# Patient Record
Sex: MALE | Race: WHITE | NOT HISPANIC OR LATINO
[De-identification: names, ages, dates, MRNs, and addresses within clinical notes are randomized per-mention and may not be internally consistent; named-entity substitution may affect disease eponyms.]

---

## 2018-08-14 ENCOUNTER — APPOINTMENT (OUTPATIENT)
Dept: VASCULAR SURGERY | Facility: CLINIC | Age: 43
End: 2018-08-14
Payer: OTHER MISCELLANEOUS

## 2018-08-14 VITALS
WEIGHT: 245 LBS | BODY MASS INDEX: 34.3 KG/M2 | DIASTOLIC BLOOD PRESSURE: 86 MMHG | HEIGHT: 71 IN | HEART RATE: 82 BPM | TEMPERATURE: 98.5 F | SYSTOLIC BLOOD PRESSURE: 130 MMHG

## 2018-08-14 PROCEDURE — 99242 OFF/OP CONSLTJ NEW/EST SF 20: CPT

## 2018-08-14 PROCEDURE — 93971 EXTREMITY STUDY: CPT

## 2018-09-24 ENCOUNTER — APPOINTMENT (OUTPATIENT)
Dept: VASCULAR SURGERY | Facility: CLINIC | Age: 43
End: 2018-09-24
Payer: OTHER MISCELLANEOUS

## 2018-09-24 VITALS
SYSTOLIC BLOOD PRESSURE: 143 MMHG | BODY MASS INDEX: 36.4 KG/M2 | DIASTOLIC BLOOD PRESSURE: 81 MMHG | HEART RATE: 88 BPM | TEMPERATURE: 98.7 F | WEIGHT: 260 LBS | HEIGHT: 71 IN

## 2018-09-24 PROCEDURE — 99212 OFFICE O/P EST SF 10 MIN: CPT

## 2018-12-24 ENCOUNTER — APPOINTMENT (OUTPATIENT)
Dept: VASCULAR SURGERY | Facility: CLINIC | Age: 43
End: 2018-12-24
Payer: OTHER MISCELLANEOUS

## 2018-12-24 VITALS
WEIGHT: 260 LBS | DIASTOLIC BLOOD PRESSURE: 85 MMHG | HEIGHT: 71 IN | SYSTOLIC BLOOD PRESSURE: 124 MMHG | BODY MASS INDEX: 36.4 KG/M2 | TEMPERATURE: 98.4 F | HEART RATE: 83 BPM

## 2018-12-24 PROCEDURE — 99213 OFFICE O/P EST LOW 20 MIN: CPT

## 2019-04-03 ENCOUNTER — APPOINTMENT (OUTPATIENT)
Dept: VASCULAR SURGERY | Facility: CLINIC | Age: 44
End: 2019-04-03
Payer: OTHER MISCELLANEOUS

## 2019-04-03 VITALS
TEMPERATURE: 98.2 F | BODY MASS INDEX: 34.72 KG/M2 | DIASTOLIC BLOOD PRESSURE: 81 MMHG | SYSTOLIC BLOOD PRESSURE: 116 MMHG | HEIGHT: 71 IN | WEIGHT: 248 LBS | HEART RATE: 81 BPM

## 2019-04-03 DIAGNOSIS — S81.812S LACERATION W/OUT FOREIGN BODY, LEFT LOWER LEG, SEQUELA: ICD-10-CM

## 2019-04-03 PROCEDURE — 99213 OFFICE O/P EST LOW 20 MIN: CPT

## 2019-07-17 ENCOUNTER — APPOINTMENT (OUTPATIENT)
Dept: VASCULAR SURGERY | Facility: CLINIC | Age: 44
End: 2019-07-17

## 2022-10-31 ENCOUNTER — APPOINTMENT (OUTPATIENT)
Dept: ORTHOPEDIC SURGERY | Facility: CLINIC | Age: 47
End: 2022-10-31

## 2022-10-31 DIAGNOSIS — M25.672 STIFFNESS OF LEFT ANKLE, NOT ELSEWHERE CLASSIFIED: ICD-10-CM

## 2022-10-31 DIAGNOSIS — Z00.00 ENCOUNTER FOR GENERAL ADULT MEDICAL EXAMINATION W/OUT ABNORMAL FINDINGS: ICD-10-CM

## 2022-10-31 PROCEDURE — 73630 X-RAY EXAM OF FOOT: CPT | Mod: 50

## 2022-10-31 PROCEDURE — 99214 OFFICE O/P EST MOD 30 MIN: CPT

## 2022-10-31 PROCEDURE — L1902: CPT | Mod: RT

## 2022-10-31 RX ORDER — MELOXICAM 15 MG/1
15 TABLET ORAL DAILY
Qty: 30 | Refills: 2 | Status: COMPLETED | COMMUNITY
Start: 2022-10-31 | End: 1900-01-01

## 2022-10-31 NOTE — HISTORY OF PRESENT ILLNESS
[Result of repetitive motion] : result of repetitive motion [9] : 9 [5] : 5 [Burning] : burning [Sharp] : sharp [Household chores] : household chores [Leisure] : leisure [Work] : work [Social interactions] : social interactions [Rest] : rest [Sitting] : sitting [Standing] : standing [Walking] : walking [Stairs] : stairs [de-identified] : Patient is a 46 year old M who presents today for evaluation of their B/L feet (L>R). Pt states that he has startup pain along his heel since mid September 2022. Denies trauma. No numbness/tingling. Ice to affected area, CBD cream. No formal treatment to date. WB in sneakers.  [] : Post Surgical Visit: no [FreeTextEntry1] : B/L feet

## 2022-10-31 NOTE — PHYSICAL EXAM
[Left] : left foot and ankle [Right] : right foot and ankle [NL (40)] : plantar flexion 40 degrees [NL 30)] : inversion 30 degrees [NL (20)] : eversion 20 degrees [5___] : [facility] 5[unfilled]/5 [2+] : posterior tibialis pulse: 2+ [Normal] : saphenous nerve sensation normal [] : patient ambulates without assistive device [Bilateral] : foot bilaterally [Weight -] : weightbearing [de-identified] : LT plantar heel spur.  [de-identified] : eversion 15 degrees [TWNoteComboBox7] : dorsiflexion 15 degrees

## 2022-10-31 NOTE — ASSESSMENT
[FreeTextEntry1] : WBAT in supportive footwear.\par Ice to affected area.\par NSAIDS prn.\par Recommend daily stretching exercises, demonstrated to patient today. \par \par The patient was explained the options as well as benefits of over the counter verses prescription strength nonsteroidal anti-inflammatory medication. The patient opts for a prescription strength medication.\par

## 2022-11-09 ENCOUNTER — APPOINTMENT (OUTPATIENT)
Dept: ORTHOPEDIC SURGERY | Facility: CLINIC | Age: 47
End: 2022-11-09

## 2022-11-28 ENCOUNTER — APPOINTMENT (OUTPATIENT)
Dept: ORTHOPEDIC SURGERY | Facility: CLINIC | Age: 47
End: 2022-11-28

## 2022-11-28 DIAGNOSIS — M72.2 PLANTAR FASCIAL FIBROMATOSIS: ICD-10-CM

## 2022-11-28 PROCEDURE — 99214 OFFICE O/P EST MOD 30 MIN: CPT

## 2022-11-28 NOTE — PHYSICAL EXAM
[Left] : left foot and ankle [Right] : right foot and ankle [NL (40)] : plantar flexion 40 degrees [NL 30)] : inversion 30 degrees [5___] : [facility] 5[unfilled]/5 [2+] : posterior tibialis pulse: 2+ [Normal] : saphenous nerve sensation normal [Bilateral] : foot bilaterally [Weight -] : weightbearing [NL (20)] : eversion 20 degrees [Decreased] : saphenous nerve sensation decreased [] : non-antalgic [de-identified] : LT plantar heel spur.  [de-identified] : eversion 15 degrees [TWNoteComboBox7] : dorsiflexion 15 degrees

## 2022-11-28 NOTE — HISTORY OF PRESENT ILLNESS
[Result of repetitive motion] : result of repetitive motion [9] : 9 [5] : 5 [Burning] : burning [Sharp] : sharp [Household chores] : household chores [Leisure] : leisure [Work] : work [Social interactions] : social interactions [Rest] : rest [Sitting] : sitting [Standing] : standing [Walking] : walking [Stairs] : stairs [de-identified] : Patient is a 46 year old M who presents today for follow up of their B/L feet (L>R). Pt states that he has startup pain along his heel since mid September 2022. Denies trauma. No numbness/tingling. Ice to affected area, CBD cream. Mobic.  WB in sneakers with airheel. He feels about 85% better.  [] : Post Surgical Visit: no [FreeTextEntry1] : B/L feet

## 2024-03-04 ENCOUNTER — EMERGENCY (EMERGENCY)
Facility: HOSPITAL | Age: 49
LOS: 1 days | Discharge: HOME | End: 2024-03-04
Attending: EMERGENCY MEDICINE
Payer: COMMERCIAL

## 2024-03-04 VITALS
TEMPERATURE: 98 F | DIASTOLIC BLOOD PRESSURE: 103 MMHG | HEART RATE: 102 BPM | RESPIRATION RATE: 17 BRPM | OXYGEN SATURATION: 99 % | SYSTOLIC BLOOD PRESSURE: 156 MMHG | HEIGHT: 71 IN | WEIGHT: 235.01 LBS

## 2024-03-04 VITALS
DIASTOLIC BLOOD PRESSURE: 90 MMHG | OXYGEN SATURATION: 97 % | TEMPERATURE: 98 F | RESPIRATION RATE: 16 BRPM | SYSTOLIC BLOOD PRESSURE: 144 MMHG | HEART RATE: 80 BPM

## 2024-03-04 LAB
ALBUMIN SERPL ELPH-MCNC: 4.7 G/DL — SIGNIFICANT CHANGE UP (ref 3.3–5)
ALP SERPL-CCNC: 47 U/L — SIGNIFICANT CHANGE UP (ref 40–120)
ALT FLD-CCNC: 31 U/L — SIGNIFICANT CHANGE UP (ref 10–45)
ANION GAP SERPL CALC-SCNC: 11 MMOL/L — SIGNIFICANT CHANGE UP (ref 5–17)
APTT BLD: 28 SEC — SIGNIFICANT CHANGE UP (ref 24.5–35.6)
AST SERPL-CCNC: 25 U/L — SIGNIFICANT CHANGE UP (ref 10–40)
BASOPHILS # BLD AUTO: 0.03 K/UL — SIGNIFICANT CHANGE UP (ref 0–0.2)
BASOPHILS NFR BLD AUTO: 0.4 % — SIGNIFICANT CHANGE UP (ref 0–2)
BILIRUB SERPL-MCNC: 0.6 MG/DL — SIGNIFICANT CHANGE UP (ref 0.2–1.2)
BLD GP AB SCN SERPL QL: NEGATIVE — SIGNIFICANT CHANGE UP
BUN SERPL-MCNC: 18 MG/DL — SIGNIFICANT CHANGE UP (ref 7–23)
CALCIUM SERPL-MCNC: 9.4 MG/DL — SIGNIFICANT CHANGE UP (ref 8.4–10.5)
CHLORIDE SERPL-SCNC: 102 MMOL/L — SIGNIFICANT CHANGE UP (ref 96–108)
CO2 SERPL-SCNC: 26 MMOL/L — SIGNIFICANT CHANGE UP (ref 22–31)
CREAT SERPL-MCNC: 1.03 MG/DL — SIGNIFICANT CHANGE UP (ref 0.5–1.3)
EGFR: 91 ML/MIN/1.73M2 — SIGNIFICANT CHANGE UP
EOSINOPHIL # BLD AUTO: 0.01 K/UL — SIGNIFICANT CHANGE UP (ref 0–0.5)
EOSINOPHIL NFR BLD AUTO: 0.1 % — SIGNIFICANT CHANGE UP (ref 0–6)
GLUCOSE SERPL-MCNC: 118 MG/DL — HIGH (ref 70–99)
HCT VFR BLD CALC: 38.4 % — LOW (ref 39–50)
HGB BLD-MCNC: 13.9 G/DL — SIGNIFICANT CHANGE UP (ref 13–17)
IMM GRANULOCYTES NFR BLD AUTO: 0.1 % — SIGNIFICANT CHANGE UP (ref 0–0.9)
INR BLD: 1.08 RATIO — SIGNIFICANT CHANGE UP (ref 0.85–1.18)
LYMPHOCYTES # BLD AUTO: 0.84 K/UL — LOW (ref 1–3.3)
LYMPHOCYTES # BLD AUTO: 11.7 % — LOW (ref 13–44)
MCHC RBC-ENTMCNC: 31.3 PG — SIGNIFICANT CHANGE UP (ref 27–34)
MCHC RBC-ENTMCNC: 36.2 GM/DL — HIGH (ref 32–36)
MCV RBC AUTO: 86.5 FL — SIGNIFICANT CHANGE UP (ref 80–100)
MONOCYTES # BLD AUTO: 0.37 K/UL — SIGNIFICANT CHANGE UP (ref 0–0.9)
MONOCYTES NFR BLD AUTO: 5.1 % — SIGNIFICANT CHANGE UP (ref 2–14)
NEUTROPHILS # BLD AUTO: 5.94 K/UL — SIGNIFICANT CHANGE UP (ref 1.8–7.4)
NEUTROPHILS NFR BLD AUTO: 82.6 % — HIGH (ref 43–77)
NRBC # BLD: 0 /100 WBCS — SIGNIFICANT CHANGE UP (ref 0–0)
PLATELET # BLD AUTO: 196 K/UL — SIGNIFICANT CHANGE UP (ref 150–400)
POTASSIUM SERPL-MCNC: 3.7 MMOL/L — SIGNIFICANT CHANGE UP (ref 3.5–5.3)
POTASSIUM SERPL-SCNC: 3.7 MMOL/L — SIGNIFICANT CHANGE UP (ref 3.5–5.3)
PROT SERPL-MCNC: 7.4 G/DL — SIGNIFICANT CHANGE UP (ref 6–8.3)
PROTHROM AB SERPL-ACNC: 11.3 SEC — SIGNIFICANT CHANGE UP (ref 9.5–13)
RBC # BLD: 4.44 M/UL — SIGNIFICANT CHANGE UP (ref 4.2–5.8)
RBC # FLD: 12 % — SIGNIFICANT CHANGE UP (ref 10.3–14.5)
RH IG SCN BLD-IMP: POSITIVE — SIGNIFICANT CHANGE UP
SODIUM SERPL-SCNC: 139 MMOL/L — SIGNIFICANT CHANGE UP (ref 135–145)
WBC # BLD: 7.2 K/UL — SIGNIFICANT CHANGE UP (ref 3.8–10.5)
WBC # FLD AUTO: 7.2 K/UL — SIGNIFICANT CHANGE UP (ref 3.8–10.5)

## 2024-03-04 PROCEDURE — 86901 BLOOD TYPING SEROLOGIC RH(D): CPT

## 2024-03-04 PROCEDURE — 85730 THROMBOPLASTIN TIME PARTIAL: CPT

## 2024-03-04 PROCEDURE — 70450 CT HEAD/BRAIN W/O DYE: CPT | Mod: MC

## 2024-03-04 PROCEDURE — 86850 RBC ANTIBODY SCREEN: CPT

## 2024-03-04 PROCEDURE — 76376 3D RENDER W/INTRP POSTPROCES: CPT | Mod: 26

## 2024-03-04 PROCEDURE — 70450 CT HEAD/BRAIN W/O DYE: CPT | Mod: 26,MC

## 2024-03-04 PROCEDURE — 73030 X-RAY EXAM OF SHOULDER: CPT | Mod: 26,LT,77

## 2024-03-04 PROCEDURE — 96374 THER/PROPH/DIAG INJ IV PUSH: CPT | Mod: XU

## 2024-03-04 PROCEDURE — 71045 X-RAY EXAM CHEST 1 VIEW: CPT | Mod: 26

## 2024-03-04 PROCEDURE — 73080 X-RAY EXAM OF ELBOW: CPT | Mod: 26,LT

## 2024-03-04 PROCEDURE — 73110 X-RAY EXAM OF WRIST: CPT | Mod: 26,LT

## 2024-03-04 PROCEDURE — 99156 MOD SED OTH PHYS/QHP 5/>YRS: CPT | Mod: XU

## 2024-03-04 PROCEDURE — 72125 CT NECK SPINE W/O DYE: CPT | Mod: MC

## 2024-03-04 PROCEDURE — 73110 X-RAY EXAM OF WRIST: CPT

## 2024-03-04 PROCEDURE — 73080 X-RAY EXAM OF ELBOW: CPT

## 2024-03-04 PROCEDURE — 73200 CT UPPER EXTREMITY W/O DYE: CPT | Mod: 26,77,LT,MC

## 2024-03-04 PROCEDURE — 71250 CT THORAX DX C-: CPT | Mod: MC

## 2024-03-04 PROCEDURE — 73030 X-RAY EXAM OF SHOULDER: CPT

## 2024-03-04 PROCEDURE — 99285 EMERGENCY DEPT VISIT HI MDM: CPT | Mod: 25

## 2024-03-04 PROCEDURE — 72125 CT NECK SPINE W/O DYE: CPT | Mod: 26,MC

## 2024-03-04 PROCEDURE — 73090 X-RAY EXAM OF FOREARM: CPT

## 2024-03-04 PROCEDURE — 23650 CLTX SHO DSLC W/MNPJ WO ANES: CPT | Mod: LT

## 2024-03-04 PROCEDURE — 99156 MOD SED OTH PHYS/QHP 5/>YRS: CPT

## 2024-03-04 PROCEDURE — 86900 BLOOD TYPING SEROLOGIC ABO: CPT

## 2024-03-04 PROCEDURE — 73200 CT UPPER EXTREMITY W/O DYE: CPT | Mod: 26,LT,MC

## 2024-03-04 PROCEDURE — 73060 X-RAY EXAM OF HUMERUS: CPT

## 2024-03-04 PROCEDURE — 73020 X-RAY EXAM OF SHOULDER: CPT

## 2024-03-04 PROCEDURE — 76377 3D RENDER W/INTRP POSTPROCES: CPT | Mod: 26

## 2024-03-04 PROCEDURE — 96375 TX/PRO/DX INJ NEW DRUG ADDON: CPT | Mod: XU

## 2024-03-04 PROCEDURE — 76377 3D RENDER W/INTRP POSTPROCES: CPT

## 2024-03-04 PROCEDURE — 96376 TX/PRO/DX INJ SAME DRUG ADON: CPT | Mod: XU

## 2024-03-04 PROCEDURE — 71045 X-RAY EXAM CHEST 1 VIEW: CPT

## 2024-03-04 PROCEDURE — 73090 X-RAY EXAM OF FOREARM: CPT | Mod: 26,LT

## 2024-03-04 PROCEDURE — 73200 CT UPPER EXTREMITY W/O DYE: CPT | Mod: MC

## 2024-03-04 PROCEDURE — 73030 X-RAY EXAM OF SHOULDER: CPT | Mod: 26,LT

## 2024-03-04 PROCEDURE — 73060 X-RAY EXAM OF HUMERUS: CPT | Mod: 26,LT

## 2024-03-04 PROCEDURE — 80053 COMPREHEN METABOLIC PANEL: CPT

## 2024-03-04 PROCEDURE — 85610 PROTHROMBIN TIME: CPT

## 2024-03-04 PROCEDURE — 85025 COMPLETE CBC W/AUTO DIFF WBC: CPT

## 2024-03-04 PROCEDURE — 76376 3D RENDER W/INTRP POSTPROCES: CPT

## 2024-03-04 PROCEDURE — 73020 X-RAY EXAM OF SHOULDER: CPT | Mod: 26,59,LT

## 2024-03-04 PROCEDURE — 71250 CT THORAX DX C-: CPT | Mod: 26,MC

## 2024-03-04 RX ORDER — DIAZEPAM 5 MG
5 TABLET ORAL ONCE
Refills: 0 | Status: DISCONTINUED | OUTPATIENT
Start: 2024-03-04 | End: 2024-03-04

## 2024-03-04 RX ORDER — SODIUM CHLORIDE 9 MG/ML
1000 INJECTION INTRAMUSCULAR; INTRAVENOUS; SUBCUTANEOUS ONCE
Refills: 0 | Status: COMPLETED | OUTPATIENT
Start: 2024-03-04 | End: 2024-03-04

## 2024-03-04 RX ORDER — ACETAMINOPHEN 500 MG
1000 TABLET ORAL ONCE
Refills: 0 | Status: COMPLETED | OUTPATIENT
Start: 2024-03-04 | End: 2024-03-04

## 2024-03-04 RX ORDER — MORPHINE SULFATE 50 MG/1
4 CAPSULE, EXTENDED RELEASE ORAL ONCE
Refills: 0 | Status: DISCONTINUED | OUTPATIENT
Start: 2024-03-04 | End: 2024-03-04

## 2024-03-04 RX ORDER — PROPOFOL 10 MG/ML
160 INJECTION, EMULSION INTRAVENOUS ONCE
Refills: 0 | Status: COMPLETED | OUTPATIENT
Start: 2024-03-04 | End: 2024-03-04

## 2024-03-04 RX ADMIN — Medication 5 MILLIGRAM(S): at 17:09

## 2024-03-04 RX ADMIN — MORPHINE SULFATE 4 MILLIGRAM(S): 50 CAPSULE, EXTENDED RELEASE ORAL at 18:09

## 2024-03-04 RX ADMIN — Medication 400 MILLIGRAM(S): at 19:56

## 2024-03-04 RX ADMIN — PROPOFOL 160 MILLIGRAM(S): 10 INJECTION, EMULSION INTRAVENOUS at 20:30

## 2024-03-04 RX ADMIN — MORPHINE SULFATE 4 MILLIGRAM(S): 50 CAPSULE, EXTENDED RELEASE ORAL at 16:08

## 2024-03-04 RX ADMIN — SODIUM CHLORIDE 1000 MILLILITER(S): 9 INJECTION INTRAMUSCULAR; INTRAVENOUS; SUBCUTANEOUS at 16:08

## 2024-03-04 RX ADMIN — Medication 400 MILLIGRAM(S): at 14:21

## 2024-03-04 NOTE — ED PROVIDER NOTE - CARE PROVIDER_API CALL
Callum Arceo  Orthopaedic Surgery  [address] [place] [address], [address]  [place] [address]  Phone: [phone]  Fax: [phone]  Follow Up Time: 1-3 Days

## 2024-03-04 NOTE — ED PROVIDER NOTE - SHIFT CHANGE DETAILS
Attending MD Wood: 48M s/p fall from standing into a hole, landed on L shoulder, +humeral head fx and dislocation, hard large meal at around noon, unable to be sedated at this time, pending CTH, neck, chest, pending results of CTs and pending Ortho

## 2024-03-04 NOTE — ED PROVIDER NOTE - PATIENT PORTAL LINK FT
You can access the FollowMyHealth Patient Portal offered by [facility] by registering at the following website: [email or web address]. By joining [employer]’s FollowMyHealth portal, you will also be able to view your health information using other applications (apps) compatible with our system.

## 2024-03-04 NOTE — ED PROVIDER NOTE - PROGRESS NOTE DETAILS
Attending MD Wood: Ortho reports patient very anxious.  Requesting an anxiolytic.  Will give oral Valium and ortho to reattempt. Conscious sedation performed w/o complications. Per xray performed at bedside, left shoulder appears reduced. Will wait for final read and continue to monitor pt. Eliecer Tabares PA-C Attending MD Wood: L shoulder reduced by ortho, requested post op CT of shoulder, patient will follow up with Dr. Foster within a week.  Patient will remain in L shoulder sling/brace for that time.  Patient will be LUE nonweight bearing.  Awaiting CT results. Attending MD Wood: L shoulder reduced by ortho, requested post op CT of shoulder, patient will follow up with Dr. Arceo within a week.  Patient will remain in L shoulder immobilizer for that time.  Patient will be LUE nonweight bearing.  Awaiting CT results. Attending MD Wood: Prelim CT scan read reviewed, discussed with Dr. Cha of ortho, patient stable for discharge from ortho standpoint.  Stable for discharge. Follow up instructions given, importance of follow up emphasized, return to ED parameters reviewed and patient verbalized understanding.  All questions answered, all concerns addressed.

## 2024-03-04 NOTE — ED PROVIDER NOTE - CLINICAL SUMMARY MEDICAL DECISION MAKING FREE TEXT BOX
47 yo M a PMH of FREDI not on CPAP presents sp trauma PTA. Pt works as , was standing on a wooden beam when he slipped and fell approx 4-5 feet. Fell on to his left arm and hit the front of his head. Somehow still landed on his feet. Was ambulatory after. No LOC. C/o left sided arm pain worse over shoulder and upper arm. R hand dominant. Was given IV fentanyl 100mcg which reports made him feel "woozy" but otherwise denies confusion. Denies headache, dizziness, neck or back pain, bladder/bowel dsfxn, extremity weakness/paresthesias. Not on AC or ASA. Plan for CXR/xray L shoulder/humerus/elbow/forearm and wrist. Labs- cbc/cmp/coags, pain control and reassess. Eliecer Tabares PA-C 49 yo M a PMH of FREDI not on CPAP presents sp trauma PTA. Pt works as , was standing on a wooden beam when he slipped and fell approx 4-5 feet. Fell on to his left arm and hit the front of his head. Somehow still landed on his feet. Was ambulatory after. No LOC. C/o left sided arm pain worse over shoulder and upper arm. R hand dominant. Was given IV fentanyl 100mcg which reports made him feel "woozy" but otherwise denies confusion. Denies headache, dizziness, neck or back pain, bladder/bowel dsfxn, extremity weakness/paresthesias. Not on AC or ASA. Plan for CXR/xray L shoulder/humerus/elbow/forearm and wrist. Labs- cbc/cmp/coags, pain control and reassess. 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 Pt does not think he hit his head. Complains of pain in L shoulder and L chest. On exam, GCS 15, NCAT, PERRL, No posterior midline T/L tenderness. L shoulder with deformity, nv intact + radial pulse. +mild L chest wall ttp. Abdomen soft non tender, Pelvis stable, non tender.   Check labs, CT/xray to eval for injury, dislocation/fracture. Pain control and monitor.

## 2024-03-04 NOTE — ED PROVIDER NOTE - PHYSICAL EXAMINATION
CONSTITUTIONAL: Uncomfortable appearing.  ENT: Airway patent, moist mucous membranes.   EYES: Pupils equal, round and reactive to light. EOMI. Conjunctiva normal appearing.   NECK: No midline cspine TTP. FROM of neck. +Left sided paraspinal cervical spine TTP over trap. No swelling.   CARDIAC: Normal rate, regular rhythm.  Heart sounds S1, S2.    CHEST: No chest wall TTP.   RESPIRATORY: Breath sounds clear and equal bilaterally.   GASTROINTESTINAL: Abdomen soft, non-tender, not distended.  MUSCULOSKELETAL: Spine appears normal. No midline TTP.  +TTP over left humeral head and mid humeral shaft with obvious deformity. No TTP of left elbow or wrist. Able to move fingers. Radial pulse intact. RUE WNL. Moving BLE.   NEUROLOGICAL: Alert and oriented x3, grossly normal.

## 2024-03-04 NOTE — ED PROVIDER NOTE - DATE/TIME 2
Refill: Simvastatin  Last Refill: 4/25/2018  Last Visit: 2/17/2018  Future Visit: 8/18/2018   04-Mar-2024 20:49

## 2024-03-04 NOTE — ED ADULT NURSE NOTE - OBJECTIVE STATEMENT
The pt is a 48Y M with PMH of HLD, HTN and sleep apnea. PT was BIBEMS after falling at work. Pt describes the fall as a 5 foot fall into a ditch where he hit his head, left shoulder and chest wall. PT denies LOC, pt on baby asprin. PT denies Chest pain, SOB, fevers chills. Pt displays limited ROM in upper left arm, has full ROM in the lower extremities.  PIV placed in patient right arm by EMS and given 100MCG of fentanyl . PT is AOx4 breathing on room air and able to speak in full sentences . PT was placed in stretcher with comfort and safety measures provided

## 2024-03-04 NOTE — ED ADULT TRIAGE NOTE - GLASGOW COMA SCALE: BEST VERBAL RESPONSE, MLM
Cordis placed at bedside.  PA catheter placed subsequently.  Unable to obtain wedge pressure.
(V5) oriented

## 2024-03-04 NOTE — ED ADULT NURSE REASSESSMENT NOTE - NS ED NURSE REASSESS COMMENT FT1
Room prepared for conscious sedation. See time out sheet in chart. Consent signed with ortho, placed in patient chart.

## 2024-03-04 NOTE — CONSULT NOTE ADULT - SUBJECTIVE AND OBJECTIVE BOX
48y Male RHD presents with LEFT shoulder pain s/p mechanical fall. 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 Denies numbness/tingling in the affected extremity. Denies other orthopedic injuries at this time. Patient ambulates without assistance at baseline.    PAST MEDICAL & SURGICAL HISTORY:  No pertinent past medical history        Home Medications:    Allergies    penicillin (Unknown)    Intolerances        Vital Signs Last 24 Hrs  T(C): 36.8 (04 Mar 2024 18:10), Max: 36.8 (04 Mar 2024 14:40)  T(F): 98.2 (04 Mar 2024 18:10), Max: 98.3 (04 Mar 2024 14:40)  HR: 77 (04 Mar 2024 20:50) (77 - 102)  BP: 130/97 (04 Mar 2024 20:50) (125/87 - 165/102)  BP(mean): 109 (04 Mar 2024 20:50) (91 - 109)  RR: 12 (04 Mar 2024 20:50) (12 - 18)  SpO2: 98% (04 Mar 2024 20:50) (96% - 100%)    Parameters below as of 04 Mar 2024 20:45  Patient On (Oxygen Delivery Method): room air                              13.9   7.20  )-----------( 196      ( 04 Mar 2024 15:52 )             38.4     03-04    139  |  102  |  18  ----------------------------<  118<H>  3.7   |  26  |  1.03    Ca    9.4      04 Mar 2024 15:52    TPro  7.4  /  Alb  4.7  /  TBili  0.6  /  DBili  x   /  AST  25  /  ALT  31  /  AlkPhos  47  03-04    PT/INR - ( 04 Mar 2024 15:52 )   PT: 11.3 sec;   INR: 1.08 ratio         PTT - ( 04 Mar 2024 15:52 )  PTT:28.0 sec  Urinalysis Basic - ( 04 Mar 2024 15:52 )    Color: x / Appearance: x / SG: x / pH: x  Gluc: 118 mg/dL / Ketone: x  / Bili: x / Urobili: x   Blood: x / Protein: x / Nitrite: x   Leuk Esterase: x / RBC: x / WBC x   Sq Epi: x / Non Sq Epi: x / Bacteria: x        PHYSICAL EXAM  General: NAD, Awake and Alert    LEFT Upper Extremity:  Skin intact  swelling of the shoulder/upper arm  + TTP over the shoulder diffusely  NTTP over the bony prominences of the elbow/wrist/hand/fingers  Pain with attempted ROM of the shoulder   Painless active/passive ROM of the elbow/wrist/hand/fingers  C5-T1 SILT  Motor: + Ax/Musc/Med/Rad/Uln/AIN/PIN  + Radial pulses  Compartments soft and compressible    Secondary Assessment:  NC/AT, NTTP of clavicles, NTTP of C-,T-,L-Spine, NTTP of Pelvis  RUE: NTTP of Shoulder, Elbow, Wrist, Hand; NT with AROM/PROM of Shoulder, Elbow, Wrist, Hand; AIN/PIN/Med/Uln/Msc/Rad/Ax intact  LLE: Able to SLR, NT with Log Roll, NT with Heel Strike, NTTP of Hip, Knee, Ankle, foot; NT with AROM/PROM of Hip, Knee, Ankle, footQ/H/Gsc/TA/EHL/FHL intact  RLE: Able to SLR, NT with Log Roll, NT with Heel Strike, NTTP of Hip, Knee, Ankle, foot; NT with AROM/PROM of Hip, Knee, Ankle, footQ/H/Gsc/TA/EHL/FHL intact      IMAGING:  XR  LEFT Shoulder/Humerus: anterioly dislocated shoulder with Hill sachs lesion and Bony bankart       Assessment/Plan:  48y Male with LEFT fracture     -Obtain CT shoulder prior to discharge  -Pain control as needed  -NWB LEFT UE in sling  -Sling for 2 weeks, no ROM of shoulder at all  -Wiggle fingers and move wrist/elbow periodically  -No acute orthopedic surgical intervention at this time  -Possible need for surgical intervention in future discussed, patient expresses full understanding  -Recommend follow up with Dr. Arceo in 7-10 days, please call office for appointment  -Will discuss with attending and advise if plan changes     48y Male RHD presents with LEFT shoulder pain s/p mechanical fall. 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 Denies numbness/tingling in the affected extremity. Denies other orthopedic injuries at this time. Patient ambulates without assistance at baseline.    PAST MEDICAL & SURGICAL HISTORY:  No pertinent past medical history        Home Medications:    Allergies    penicillin (Unknown)    Intolerances        Vital Signs Last 24 Hrs  T(C): 36.8 (04 Mar 2024 18:10), Max: 36.8 (04 Mar 2024 14:40)  T(F): 98.2 (04 Mar 2024 18:10), Max: 98.3 (04 Mar 2024 14:40)  HR: 77 (04 Mar 2024 20:50) (77 - 102)  BP: 130/97 (04 Mar 2024 20:50) (125/87 - 165/102)  BP(mean): 109 (04 Mar 2024 20:50) (91 - 109)  RR: 12 (04 Mar 2024 20:50) (12 - 18)  SpO2: 98% (04 Mar 2024 20:50) (96% - 100%)    Parameters below as of 04 Mar 2024 20:45  Patient On (Oxygen Delivery Method): room air                              13.9   7.20  )-----------( 196      ( 04 Mar 2024 15:52 )             38.4     03-04    139  |  102  |  18  ----------------------------<  118<H>  3.7   |  26  |  1.03    Ca    9.4      04 Mar 2024 15:52    TPro  7.4  /  Alb  4.7  /  TBili  0.6  /  DBili  x   /  AST  25  /  ALT  31  /  AlkPhos  47  03-04    PT/INR - ( 04 Mar 2024 15:52 )   PT: 11.3 sec;   INR: 1.08 ratio         PTT - ( 04 Mar 2024 15:52 )  PTT:28.0 sec  Urinalysis Basic - ( 04 Mar 2024 15:52 )    Color: x / Appearance: x / SG: x / pH: x  Gluc: 118 mg/dL / Ketone: x  / Bili: x / Urobili: x   Blood: x / Protein: x / Nitrite: x   Leuk Esterase: x / RBC: x / WBC x   Sq Epi: x / Non Sq Epi: x / Bacteria: x        PHYSICAL EXAM  General: NAD, Awake and Alert    LEFT Upper Extremity:  Skin intact  swelling of the shoulder/upper arm  + TTP over the shoulder diffusely  NTTP over the bony prominences of the elbow/wrist/hand/fingers  Pain with attempted ROM of the shoulder   Painless active/passive ROM of the elbow/wrist/hand/fingers  C5-T1 SILT  Motor: + Ax/Musc/Med/Rad/Uln/AIN/PIN  + Radial pulses  Compartments soft and compressible    Secondary Assessment:  NC/AT, NTTP of clavicles, NTTP of C-,T-,L-Spine, NTTP of Pelvis  RUE: NTTP of Shoulder, Elbow, Wrist, Hand; NT with AROM/PROM of Shoulder, Elbow, Wrist, Hand; AIN/PIN/Med/Uln/Msc/Rad/Ax intact  LLE: Able to SLR, NT with Log Roll, NT with Heel Strike, NTTP of Hip, Knee, Ankle, foot; NT with AROM/PROM of Hip, Knee, Ankle, footQ/H/Gsc/TA/EHL/FHL intact  RLE: Able to SLR, NT with Log Roll, NT with Heel Strike, NTTP of Hip, Knee, Ankle, foot; NT with AROM/PROM of Hip, Knee, Ankle, footQ/H/Gsc/TA/EHL/FHL intact      IMAGING:  XR  LEFT Shoulder/Humerus: anteriorly dislocated shoulder with Hill sachs lesion and Bony bankart     Procedure:  Risks and benefits for the procedure were explained to the patient. An injection was offered to the patient for analgesia prior to procedure. The patient expressed understanding and agreed with the plan. The patient gave verbal consent for the injection and procedure. The skin was prepped in sterile fashion with alcohol scrub. The L shoulder site was then injected with 10mL 1% lidocaine without epinephrine and 10cc sterile normal saline. Time was allowed for anesthetic effect to occur. Closed reduction was attempted after also administering 5mg PO valium and morphine. Closed reduction was unsucessful. Conscious sedation was performed shortly afterwards with Propofol by ED. Closed reduction was then successful after adequate relaxation and was then immobilized in a sling. X-rays confirmed relocation. Patient was fully neurovascularly intact after reduction.      Assessment/Plan:  48y Male with LEFT fracture     -Obtain CT shoulder prior to discharge  -Pain control as needed  -NWB LEFT UE in sling  -Sling for 2 weeks, no ROM of shoulder at all  -Wiggle fingers and move wrist/elbow periodically  -No acute orthopedic surgical intervention at this time  -Possible need for surgical intervention in future discussed, patient expresses full understanding  -Recommend follow up with Dr. Arceo in 7-10 days, please call office for appointment  -Will discuss with attending and advise if plan changes

## 2024-03-04 NOTE — ED PROVIDER NOTE - NSFOLLOWUPINSTRUCTIONS_ED_ALL_ED_FT
YOU WERE SEEN IN THE ED FOR: left shoulder dislocation    WHILE YOU WERE HERE, YOU HAD: labs, x-rays, CTs and an evaluation by the orthopedic team.  Your left shoulder was found to have both a dislocation and fracture.  You had conscious sedation for your reduction which you tolerated well.      FOR PAIN, YOU MAY TAKE TYLENOL (ACETAMINOPHEN) AND/OR IBUPROFEN (Advil or Motrin). FOLLOW THE INSTRUCTIONS ON THE LABEL/CONTAINER.  DO NOT EXCEED 3000MG OF TYLENOL (ACETAMINOPHEN) IN A 24 HOUR PERIOD.    PLEASE FOLLOW UP WITH DR. PICHARDO WITHIN THE NEXT WEEK.  YOU ARE TO KEEP YOUR SHOULDER IMMOBILIZER ON UNTIL YOU ARE SEEN BY DR. PICHARDO.  YOU ARE TO BE COMPLETELY NOT WEIGHT BEARING IN THE LEFT UPPER EXTREMITY.  THIS MEANS DO NOT PICK ANYTHING UP WITH YOUR LEFT ARM OR PICK ANYTHING UP WITH THE LEFT ARM. BRING COPIES OF YOUR RESULTS/DISCHARGE INSTRUCTIONS.    RETURN TO THE EMERGENCY DEPARTMENT IF YOU EXPERIENCE ANY NEW/CONCERNING/WORSENING SYMPTOMS SUCH AS BUT NOT LIMITED TO: severe pain, increased redness, swelling, fevers, chills, rash over area or redness over area, if extremity pale or blue or any other concerns.

## 2024-03-04 NOTE — ED ADULT NURSE NOTE - NSFALLRISKINTERV_ED_ALL_ED

## 2024-03-06 NOTE — ED POST DISCHARGE NOTE - RESULT SUMMARY
Follow up call for procedural sedation follow up. Spoke w/ patient, feels well, no reactions or adverse events to sedation meds. Tolerating PO at home, feels well aside from mild shoulder soreness. Has f/u with orthopedist tomorrow.  - Nicolás Ghotra PA-C